# Patient Record
Sex: FEMALE | Race: WHITE | NOT HISPANIC OR LATINO | Employment: FULL TIME | URBAN - METROPOLITAN AREA
[De-identification: names, ages, dates, MRNs, and addresses within clinical notes are randomized per-mention and may not be internally consistent; named-entity substitution may affect disease eponyms.]

---

## 2017-07-13 ENCOUNTER — TRANSFERRED RECORDS (OUTPATIENT)
Dept: HEALTH INFORMATION MANAGEMENT | Facility: CLINIC | Age: 42
End: 2017-07-13

## 2017-08-24 ENCOUNTER — TRANSFERRED RECORDS (OUTPATIENT)
Dept: HEALTH INFORMATION MANAGEMENT | Facility: CLINIC | Age: 42
End: 2017-08-24

## 2018-01-29 ENCOUNTER — TRANSFERRED RECORDS (OUTPATIENT)
Dept: HEALTH INFORMATION MANAGEMENT | Facility: CLINIC | Age: 43
End: 2018-01-29

## 2019-04-01 ENCOUNTER — TRANSFERRED RECORDS (OUTPATIENT)
Dept: HEALTH INFORMATION MANAGEMENT | Facility: CLINIC | Age: 44
End: 2019-04-01

## 2020-12-04 ENCOUNTER — VIRTUAL VISIT (OUTPATIENT)
Dept: FAMILY MEDICINE | Facility: OTHER | Age: 45
End: 2020-12-04

## 2020-12-04 NOTE — PROGRESS NOTES
"Date: 2020 14:24:10  Clinician: Demetrius Sullivan  Clinician NPI: [id number]  Patient: Zahraa Flores  Patient : 1975  Patient Address: [address] [place], [place] [address]  Patient Phone: [phone]  Visit Protocol: URI  Patient Summary:  Zahraa is a 45 year old ( : 1975 ) female who initiated a [facility] Visit for COVID-19 (Coronavirus) evaluation and screening. When asked the question \"Please sign me up to receive news, health information and promotions from [facility].\", Zahraa responded \"No\".    Zahraa states her symptoms started gradually 7-9 days ago. After her symptoms started, they improved and then got worse again.   Her symptoms consist of a headache, nasal congestion, nausea, myalgia, malaise, and diarrhea.   Symptom details     Nasal secretions: The color of her mucus is clear.    Headache: She states the headache is moderate (4-6 on a 10 point pain scale).      Zahraa denies having ear pain, wheezing, fever, cough, vomiting, rhinitis, facial pain or pressure, chills, sore throat, teeth pain, ageusia, and anosmia. She also denies taking antibiotic medication in the past month, having recent facial or sinus surgery in the past 60 days, and having a sinus infection within the past year. She is not experiencing dyspnea.   Precipitating events  She has not recently been exposed to someone with influenza. Zahraa has been in close contact with the following high risk individuals: immunocompromised people.   Pertinent COVID-19 (Coronavirus) information  Zahraa does not work or volunteer as healthcare worker or a . In the past 14 days, Zahraa has not worked or volunteered at a healthcare facility or group living setting.   In the past 14 days, she also has not lived in a congregate living setting.   Zahraa has not had a close contact with a laboratory-confirmed COVID-19 patient within 14 days of symptom onset.    Since 2019, Zahraa has been tested for COVID-19 and has had upper " respiratory infection (URI) or influenza-like illness.      Result of COVID-19 test: Negative     Date of her COVID-19 test: 04/15/2020     Date(s) of previous URI or influenza-like illness (free-text): 01/15/2020     Symptoms Zahraa experienced during previous URI or influenza-like illness as reported by the patient (free-text): Cough, sore throat, headache        Pertinent medical history  Zahraa typically gets a yeast infection when she takes antibiotics. She has used fluconazole (Diflucan) to treat previous yeast infections. 1 dose of fluconazole (Diflucan) has typically been sufficient for symptoms to resolve in the past.   She has not been told by her provider to avoid NSAIDs.   Zahraa does not have diabetes. She denies having immunosuppressive conditions (e.g., chemotherapy, HIV, organ transplant, long-term use of steroids or other immunosuppressive medications, splenectomy). She does not have severe COPD and congestive heart failure. She does not have asthma.   Zahraa does not need a return to work/school note.   Weight: 114 lbs   Zahraa does not smoke or use smokeless tobacco.   She denies pregnancy and denies breastfeeding. She does not menstruate.   Additional information as reported by the patient (free text): I've been getting migraines and am out of my sumatriptan due to moving, and not having a doctor.   Weight: 114 lbs    MEDICATIONS: No current medications, ALLERGIES: Sulfa (Sulfonamide Antibiotics), Iodine and Iodide Containing Products, morphine  Clinician Response:  Dear Zahraa,   Your symptoms show that you may have coronavirus (COVID-19). This illness can cause fever, cough and trouble breathing. Many people get a mild case and get better on their own. Some people can get very sick.  What should I do?  We would like to test you for this virus.   1. Please call [phone] to schedule your visit. Explain that you were referred by [facility] to have a COVID-19 test. Be ready to share your [facility] visit ID  "number.  * If you need to schedule in [employer] please call [phone] or for [employer] employees please call [phone].  * If you need to schedule in the [place] area please call [phone]. [employer] employees call [phone].  The following will serve as your written order for this COVID Test, ordered by me, for the indication of suspected COVID [Z20.828]: The test will be ordered in [employer], our electronic health record, after you are scheduled. It will show as ordered and authorized by Satnam Coy MD.  Order: COVID-19 (Coronavirus) PCR for SYMPTOMATIC testing from [facility].   2. When it's time for your COVID test:  Stay at least 6 feet away from others. (If someone will drive you to your test, stay in the backseat, as far away from the  as you can.)   Cover your mouth and nose with a mask, tissue or washcloth.  Go straight to the testing site. Don't make any stops on the way there or back.      3.Starting now: Stay home and away from others (self-isolate) until:   You've had no fever---and no medicine that reduces fever---for one full day (24 hours). And...   Your other symptoms have gotten better. For example, your cough or breathing has improved. And...   At least 10 days have passed since your symptoms started.       During this time, don't leave the house except for testing or medical care.   Stay in your own room, even for meals. Use your own bathroom if you can.   Stay away from others in your home. No hugging, kissing or shaking hands. No visitors.  Don't go to work, school or anywhere else.    Clean \"high touch\" surfaces often (doorknobs, counters, handles, etc.). Use a household cleaning spray or wipes. You'll find a full list of  on the EPA website: www.epa.gov/pesticide-registration/list-n-disinfectants-use-against-sars-cov-2.   Cover your mouth and nose with a mask, tissue or washcloth to avoid spreading germs.  Wash your hands and face often. Use soap and water.  Caregivers in " these groups are at risk for severe illness due to COVID-19:  o People 65 years and older  o People who live in a nursing home or long-term care facility  o People with chronic disease (lung, heart, cancer, diabetes, kidney, liver, immunologic)  o People who have a weakened immune system, including those who:   Are in cancer treatment  Take medicine that weakens the immune system, such as corticosteroids  Had a bone marrow or organ transplant  Have an immune deficiency  Have poorly controlled HIV or AIDS  Are obese (body mass index of 40 or higher)  Smoke regularly   o Caregivers should wear gloves while washing dishes, handling laundry and cleaning bedrooms and bathrooms.  o Use caution when washing and drying laundry: Don't shake dirty laundry, and use the warmest water setting that you can.  o For more tips, go to www.cdc.gov/coronavirus/2019-ncov/downloads/10Things.pdf.    4.Sign up for [employer]. We know it's scary to hear that you might have COVID-19. We want to track your symptoms to make sure you're okay over the next 2 weeks. Please look for an email from [employer]---this is a free, online program that we'll use to keep in touch. To sign up, follow the link in the email. Learn more at [email or web address]  How can I take care of myself?   Get lots of rest. Drink extra fluids (unless a doctor has told you not to).   Take Tylenol (acetaminophen) for fever or pain. If you have liver or kidney problems, ask your family doctor if it's okay to take Tylenol.   Adults can take either:    650 mg (two 325 mg pills) every 4 to 6 hours, or...   1,000 mg (two 500 mg pills) every 8 hours as needed.    Note: Don't take more than 3,000 mg in one day. Acetaminophen is found in many medicines (both prescribed and over-the-counter medicines). Read all labels to be sure you don't take too much.   For children, check the Tylenol bottle for the right dose. The dose is based on the child's age or weight.    If  you have other health problems (like cancer, heart failure, an organ transplant or severe kidney disease): Call your specialty clinic if you don't feel better in the next 2 days.       Know when to call 911. Emergency warning signs include:    Trouble breathing or shortness of breath Pain or pressure in the chest that doesn't go away Feeling confused like you haven't felt before, or not being able to wake up Bluish-colored lips or face.  Where can I get more information?   [facility] -- About COVID-19: [email or web address]   CDC -- What to Do If You're Sick: www.cdc.gov/coronavirus/2019-ncov/about/steps-when-sick.html   [facility] -- Ending Home Isolation: www.cdc.gov/coronavirus/2019-ncov/hcp/disposition-in-home-patients.html   [facility] -- Caring for Someone: www.cdc.gov/coronavirus/2019-ncov/if-you-are-sick/care-for-someone.html   [facility] -- Interim Guidance for Hospital Discharge to Home: www.[facility].mn./diseases/coronavirus/hcp/hospdischarge.pdf   [facility] clinical trials (COVID-19 research studies): clinicalaffairs.[facility]-clinical-trials    Below are the COVID-19 hotlines at the [place] Department of Health ([facility]). Interpreters are available.    For health questions: Call 699-922-8171 or 1-355.889.1967 (7 a.m. to 7 p.m.) For questions about schools and childcare: Call 956-231-9922 or 1-175.411.4089 (7 a.m. to 7 p.m.)    Diagnosis: Contact with and (suspected) exposure to other viral communicable diseases  Diagnosis ICD: Z20.828  Prescription: sumatriptan succinate (Imitrex) 25 mg oral tablet 15 tablet, 0 days supply. Take 1 tablet by mouth, may repeat in 2 hrs. Refills: 0, Refill as needed: no, Allow substitutions: yes  Pharmacy: [facility] - [phone] - [address] [place] [id number]

## 2020-12-05 DIAGNOSIS — Z20.822 ENCOUNTER FOR LABORATORY TESTING FOR COVID-19 VIRUS: Primary | ICD-10-CM

## 2020-12-05 PROCEDURE — U0003 INFECTIOUS AGENT DETECTION BY NUCLEIC ACID (DNA OR RNA); SEVERE ACUTE RESPIRATORY SYNDROME CORONAVIRUS 2 (SARS-COV-2) (CORONAVIRUS DISEASE [COVID-19]), AMPLIFIED PROBE TECHNIQUE, MAKING USE OF HIGH THROUGHPUT TECHNOLOGIES AS DESCRIBED BY CMS-2020-01-R: HCPCS | Performed by: FAMILY MEDICINE

## 2020-12-06 LAB
SARS-COV-2 RNA SPEC QL NAA+PROBE: NOT DETECTED
SPECIMEN SOURCE: NORMAL

## 2021-01-05 ENCOUNTER — OFFICE VISIT (OUTPATIENT)
Dept: URGENT CARE | Facility: URGENT CARE | Age: 46
End: 2021-01-05
Payer: COMMERCIAL

## 2021-01-05 VITALS
RESPIRATION RATE: 16 BRPM | BODY MASS INDEX: 21.83 KG/M2 | OXYGEN SATURATION: 98 % | TEMPERATURE: 98 F | WEIGHT: 118.6 LBS | SYSTOLIC BLOOD PRESSURE: 124 MMHG | DIASTOLIC BLOOD PRESSURE: 70 MMHG | HEIGHT: 62 IN | HEART RATE: 88 BPM

## 2021-01-05 DIAGNOSIS — M54.50 ACUTE LEFT-SIDED LOW BACK PAIN WITHOUT SCIATICA: Primary | ICD-10-CM

## 2021-01-05 PROCEDURE — 99204 OFFICE O/P NEW MOD 45 MIN: CPT | Performed by: PHYSICIAN ASSISTANT

## 2021-01-05 RX ORDER — BACLOFEN 10 MG/1
10 TABLET ORAL 3 TIMES DAILY
Qty: 30 TABLET | Refills: 0 | Status: SHIPPED | OUTPATIENT
Start: 2021-01-05 | End: 2021-01-12

## 2021-01-05 RX ORDER — HYDROCODONE BITARTRATE AND ACETAMINOPHEN 5; 325 MG/1; MG/1
1 TABLET ORAL EVERY 6 HOURS PRN
Qty: 10 TABLET | Refills: 0 | Status: SHIPPED | OUTPATIENT
Start: 2021-01-05 | End: 2021-01-08

## 2021-01-05 RX ORDER — PREDNISONE 20 MG/1
40 TABLET ORAL DAILY
Qty: 10 TABLET | Refills: 0 | Status: SHIPPED | OUTPATIENT
Start: 2021-01-05 | End: 2021-01-10

## 2021-01-05 ASSESSMENT — ENCOUNTER SYMPTOMS
CONSTIPATION: 0
VOMITING: 0
SINUS PRESSURE: 0
EYE PAIN: 0
ARTHRALGIAS: 0
HEMATOCHEZIA: 0
EYE REDNESS: 0
NAUSEA: 0
ABDOMINAL PAIN: 0
SORE THROAT: 0
CHILLS: 0
COUGH: 0
EYE DISCHARGE: 0
SINUS PAIN: 0
WHEEZING: 0
BACK PAIN: 1
FATIGUE: 0
MYALGIAS: 0
SHORTNESS OF BREATH: 0
RHINORRHEA: 0
HEARTBURN: 0
PALPITATIONS: 0
DIARRHEA: 0
FEVER: 0

## 2021-01-05 ASSESSMENT — MIFFLIN-ST. JEOR: SCORE: 1133.28

## 2021-01-05 ASSESSMENT — PAIN SCALES - GENERAL: PAINLEVEL: MODERATE PAIN (5)

## 2021-01-05 NOTE — PROGRESS NOTES
Assessment & Plan     Acute left-sided low back pain without sciatica  Reviewed last urgent care note from [facility] 02/29/20 and patient was treated with a medrol dose pack and 10 Norco. Patient reports that she responded well. Will treat with prednisone 40mg once daily x 5 days Also prescribed [id number] every 8-12 hours as needed and Norco 5/325mg #10 with no refills. Can try alternating heat/ice in 20 minute intervals. Avoid aggravating factors, but encouraged gentle ROM and stretching. Would recommend PT or f/u with PCP if symptoms worsen or do not improve.      - predniSONE (DELTASONE) 20 MG tablet; Take 2 tablets (40 mg) by mouth daily for 5 days  - baclofen (LIORESAL) 10 MG tablet; Take 1 tablet (10 mg) by mouth 3 times daily  - HYDROcodone-acetaminophen (NORCO) 5-325 MG tablet; Take 1 tablet by mouth every 6 hours as needed for severe pain    Review of prior external note(s) from - CareEverywhere information from [facility] reviewed                       [facility] [place]    Subjective     Chief Complaint   Patient presents with     Back Pain     12/25/2020 lower left back pain has a heirniated disc, pain radiates to the front, unble to sleep, tylenol & ibuprofen not helping. Has a handicapped daughter that she has to lift.       HPI     Back Pain    Onset of symptoms was 1.5 week(s) ago.  Location: right low back  Radiation: does not radiate  Context:       The injury happened while at home      Mechanism: from lifting daughter, has to do full cares on daughter who is 11 years old      Patient experienced delayed pain  Course of symptoms is worsening.    Severity moderate  Current and Associated symptoms: pain  Denies: fecal incontinence, urinary incontinence, lower extremity numbness, lower extremity weakness and paresthesia    Aggravating Factors: sitting, bending, changing position and stairs  Therapies to improve symptoms include: ibuprofen and Tylenol  Past  "history: previous bulging disc 5 years ago         Review of Systems   Constitutional: Negative for chills, fatigue and fever.   HENT: Negative for congestion, ear pain, rhinorrhea, sinus pressure, sinus pain and sore throat.    Eyes: Negative for pain, discharge and redness.   Respiratory: Negative for cough, shortness of breath and wheezing.    Cardiovascular: Negative for palpitations.   Gastrointestinal: Negative for abdominal pain, constipation, diarrhea, heartburn, hematochezia, nausea and vomiting.   Musculoskeletal: Positive for back pain. Negative for arthralgias and myalgias.   Skin: Negative for rash.              Objective    /70 (BP Location: Right arm, Patient Position: Sitting, Cuff Size: Adult Regular)   Pulse 88   Temp 98  F (36.7  C) (Tympanic)   Resp 16   Ht 1.562 m (5' 1.5\")   Wt 53.8 kg (118 lb 9.6 oz)   SpO2 98%   BMI 22.05 kg/m    Body mass index is 22.05 kg/m .  Physical Exam  Constitutional:       General: She is not in acute distress.     Appearance: She is well-developed.   HENT:      Head: Normocephalic.      Right Ear: Tympanic membrane and ear canal normal.      Left Ear: Tympanic membrane and ear canal normal.   Eyes:      Conjunctiva/sclera: Conjunctivae normal.      Pupils: Pupils are equal, round, and reactive to light.   Cardiovascular:      Rate and Rhythm: Normal rate.      Heart sounds: Normal heart sounds.   Pulmonary:      Effort: Pulmonary effort is normal.      Breath sounds: Normal breath sounds.   Musculoskeletal:      Comments: No obvious deformity, erythema, edema, or ecchymosis of the thoracic or lumbar spine. There is some tenderness with palpation of left low back but patient reports deep pain with bending forward.  Negative straight leg raises. Non-tender internal and external rotation of the hips. 2+ DTR s, lower extremity CMS intact.     Skin:     General: Skin is warm and dry.      Findings: No rash.   Neurological:      Mental Status: She is alert. "   Psychiatric:         Behavior: Behavior normal.            No results found for this or any previous visit (from the past 24 hour(s)).

## 2021-01-06 ASSESSMENT — ENCOUNTER SYMPTOMS
PARESTHESIAS: 0
CHILLS: 0
NAUSEA: 0
ABDOMINAL PAIN: 0
HEARTBURN: 0
FEVER: 0
PALPITATIONS: 0
WEAKNESS: 0
FREQUENCY: 0
ARTHRALGIAS: 0
SORE THROAT: 0
SHORTNESS OF BREATH: 0
HEMATURIA: 0
NERVOUS/ANXIOUS: 0
EYE PAIN: 0
DIZZINESS: 0
COUGH: 0
DYSURIA: 0
DIARRHEA: 0
HEADACHES: 1
HEMATOCHEZIA: 0
MYALGIAS: 1
JOINT SWELLING: 0
BREAST MASS: 0
CONSTIPATION: 0

## 2021-01-09 ENCOUNTER — OFFICE VISIT (OUTPATIENT)
Dept: URGENT CARE | Facility: URGENT CARE | Age: 46
End: 2021-01-09
Payer: COMMERCIAL

## 2021-01-09 ENCOUNTER — HEALTH MAINTENANCE LETTER (OUTPATIENT)
Age: 46
End: 2021-01-09

## 2021-01-09 VITALS
SYSTOLIC BLOOD PRESSURE: 122 MMHG | RESPIRATION RATE: 16 BRPM | WEIGHT: 124.2 LBS | HEIGHT: 62 IN | TEMPERATURE: 97.5 F | HEART RATE: 80 BPM | DIASTOLIC BLOOD PRESSURE: 70 MMHG | OXYGEN SATURATION: 99 % | BODY MASS INDEX: 22.86 KG/M2

## 2021-01-09 DIAGNOSIS — M54.50 ACUTE LEFT-SIDED LOW BACK PAIN WITHOUT SCIATICA: Primary | ICD-10-CM

## 2021-01-09 PROCEDURE — 99213 OFFICE O/P EST LOW 20 MIN: CPT | Performed by: EMERGENCY MEDICINE

## 2021-01-09 RX ORDER — KETOROLAC TROMETHAMINE 10 MG/1
10 TABLET, FILM COATED ORAL EVERY 6 HOURS PRN
Qty: 20 TABLET | Refills: 0 | Status: SHIPPED | OUTPATIENT
Start: 2021-01-09 | End: 2021-02-04

## 2021-01-09 ASSESSMENT — MIFFLIN-ST. JEOR: SCORE: 1158.68

## 2021-01-09 ASSESSMENT — PAIN SCALES - GENERAL: PAINLEVEL: SEVERE PAIN (7)

## 2021-01-09 NOTE — PATIENT INSTRUCTIONS
Warm soaks plus gentle stretching for seeing in the morning    Ketorolac for pain    Quiet activity    Recheck Tuesday as planned    Strongly consider massage

## 2021-01-09 NOTE — PROGRESS NOTES
HPI: Patient is a 45-year-old female who presents to urgent care for left lower back pain.  She has an invalid daughter which requires frequent heavy lifting.  There is been no kallie injury.  She was seen here about 5 days ago at which time she was put on a short course of steroids and given hydrocodone.  She describes the pain as left lower back with some occasional radiation deep into her hip region.  No numbness or tingling down her leg.  No bowel or bladder dysfunction.  She has a history of apparent prolapsed disc in the past but states this pain feels differently.  She does have a history of kidney stones.  She has no dysuria urgency or hematuria.  The pain clearly hurts to bend twist and move.      ROS: See HPI otherwise normal.    Allergies   Allergen Reactions     Iodine Other (See Comments)     Morphine Nausea and Vomiting     Sulfa Drugs Hives     Tramadol Unknown     Shaking and cold      Current Outpatient Medications   Medication Sig Dispense Refill     baclofen (LIORESAL) 10 MG tablet Take 1 tablet (10 mg) by mouth 3 times daily 30 tablet 0     ketorolac (TORADOL) 10 MG tablet Take 1 tablet (10 mg) by mouth every 6 hours as needed for moderate pain 20 tablet 0     predniSONE (DELTASONE) 20 MG tablet Take 2 tablets (40 mg) by mouth daily for 5 days 10 tablet 0         PE: Patient notably stiff when she tries to move related to her back pain.  Examination of her back reveals localized tenderness in the lower lumbar/sacral region on the left.  Range of motion does increase her pain.  No tenderness over the left sciatic notch.  Sensorimotor exam are intact in the left leg.  Overlying skin reveals no zoster rash.        TREATMENT: None      ASSESSMENT: Back pain of musculoskeletal etiology.  Spasm and inflammation most likely causing that she is palpably tender in the localized area.      DIAGNOSIS: Lower back pain      PLAN: Strongly consider massage.  Ketorolac will be ordered for pain.  Warm soaks plus  gentle stretching.  She has an appointment with Zahraa on Tuesday which she should keep for reevaluation.

## 2021-01-12 ENCOUNTER — OFFICE VISIT (OUTPATIENT)
Dept: FAMILY MEDICINE | Facility: CLINIC | Age: 46
End: 2021-01-12
Payer: COMMERCIAL

## 2021-01-12 VITALS
RESPIRATION RATE: 16 BRPM | HEART RATE: 84 BPM | HEIGHT: 62 IN | SYSTOLIC BLOOD PRESSURE: 100 MMHG | BODY MASS INDEX: 22.26 KG/M2 | WEIGHT: 121 LBS | DIASTOLIC BLOOD PRESSURE: 62 MMHG | TEMPERATURE: 97.8 F

## 2021-01-12 DIAGNOSIS — G43.009 MIGRAINE WITHOUT AURA AND WITHOUT STATUS MIGRAINOSUS, NOT INTRACTABLE: ICD-10-CM

## 2021-01-12 DIAGNOSIS — Z00.00 ROUTINE GENERAL MEDICAL EXAMINATION AT A HEALTH CARE FACILITY: Primary | ICD-10-CM

## 2021-01-12 PROCEDURE — 99386 PREV VISIT NEW AGE 40-64: CPT | Performed by: NURSE PRACTITIONER

## 2021-01-12 RX ORDER — SUMATRIPTAN 50 MG/1
50 TABLET, FILM COATED ORAL
Qty: 12 TABLET | Refills: 1 | Status: SHIPPED | OUTPATIENT
Start: 2021-01-12 | End: 2021-04-10

## 2021-01-12 RX ORDER — SUMATRIPTAN 50 MG/1
TABLET, FILM COATED ORAL
COMMUNITY
Start: 2020-07-01 | End: 2021-01-12

## 2021-01-12 SDOH — HEALTH STABILITY: MENTAL HEALTH: HOW OFTEN DO YOU HAVE A DRINK CONTAINING ALCOHOL?: NEVER

## 2021-01-12 ASSESSMENT — ENCOUNTER SYMPTOMS
PALPITATIONS: 0
CONSTIPATION: 0
DYSURIA: 0
HEARTBURN: 0
BREAST MASS: 0
JOINT SWELLING: 0
NERVOUS/ANXIOUS: 0
ABDOMINAL PAIN: 0
COUGH: 0
WEAKNESS: 0
PARESTHESIAS: 0
MYALGIAS: 1
DIARRHEA: 0
HEADACHES: 1
SHORTNESS OF BREATH: 0
HEMATOCHEZIA: 0
EYE PAIN: 0
DIZZINESS: 0
FREQUENCY: 0
CHILLS: 0
SORE THROAT: 0
NAUSEA: 0
HEMATURIA: 0
ARTHRALGIAS: 0
FEVER: 0

## 2021-01-12 ASSESSMENT — MIFFLIN-ST. JEOR: SCORE: 1144.16

## 2021-01-12 NOTE — PATIENT INSTRUCTIONS
Please call [phone] to schedule your mammogram    Preventive Health Recommendations  Female Ages 40 to 49    Yearly exam:     See your health care provider every year in order to  1. Review health changes.   2. Discuss preventive care.    3. Review your medicines if your doctor prescribed any.      Get a Pap test every three years (unless you have an abnormal result and your provider advises testing more often).      If you get Pap tests with HPV test, you only need to test every 5 years, unless you have an abnormal result. You do not need a Pap test if your uterus was removed (hysterectomy) and you have not had cancer.      You should be tested each year for STDs (sexually transmitted diseases), if you're at risk.     Ask your doctor if you should have a mammogram.      Have a colonoscopy (test for colon cancer) if someone in your family has had colon cancer or polyps before age 50.       Have a cholesterol test every 5 years.       Have a diabetes test (fasting glucose) after age 45. If you are at risk for diabetes, you should have this test every 3 years.    Shots: Get a flu shot each year. Get a tetanus shot every 10 years.     Nutrition:     Eat at least 5 servings of fruits and vegetables each day.    Eat whole-grain bread, whole-wheat pasta and brown rice instead of white grains and rice.    Get adequate Calcium and Vitamin D.      Lifestyle    Exercise at least 150 minutes a week (an average of 30 minutes a day, 5 days a week). This will help you control your weight and prevent disease.    Limit alcohol to one drink per day.    No smoking.     Wear sunscreen to prevent skin cancer.    See your dentist every six months for an exam and cleaning.

## 2021-01-12 NOTE — PROGRESS NOTES
SUBJECTIVE:   CC: Zahraa Flores is an 45 year old woman who presents for preventive health visit.     Patient has been advised of split billing requirements and indicates understanding: Yes  Healthy Habits:     Getting at least 3 servings of Calcium per day:  Yes    Bi-annual eye exam:  Yes    Dental care twice a year:  NO    Sleep apnea or symptoms of sleep apnea:  None    Diet:  Regular (no restrictions)    Frequency of exercise:  None    Taking medications regularly:  Yes    Medication side effects:  Not applicable    PHQ-2 Total Score: 0    Additional concerns today:  No    Needs refill of Imitrex   Stable symptoms  Still has prescription from prior to move, has not been filled     Today's PHQ-2 Score:   PHQ-2 (  [employer]) 2021   Q1: Little interest or pleasure in doing things 0   Q2: Feeling down, depressed or hopeless 0   PHQ-2 Score 0   Q1: Little interest or pleasure in doing things Not at all   Q2: Feeling down, depressed or hopeless Not at all   PHQ-2 Score 0     Abuse: Current or Past (Physical, Sexual or Emotional) - No  Do you feel safe in your environment? Yes    Social History     Tobacco Use     Smoking status: Former Smoker     Packs/day: 2.00     Years: 5.00     Pack years: 10.00     Types: Cigarettes     Start date: 1991     Quit date: 1996     Years since quittin.5     Smokeless tobacco: Never Used     Tobacco comment: Quite in her 20's.   Substance Use Topics     Alcohol use: Not Currently     Frequency: Never       Alcohol Use 2021   Prescreen: >3 drinks/day or >7 drinks/week? Not Applicable       Reviewed orders with patient.  Reviewed health maintenance and updated orders accordingly - Yes    Mammogram Screening: Patient under age 50, mutual decision reflected in health maintenance.      Pertinent mammograms are reviewed under the imaging tab.  History of abnormal Pap smear: Status post hysterectomy. Records requested to determine need for PAP     Reviewed and  "updated as needed this visit by clinical staff  Tobacco  Allergies  Meds   Med Hx  Surg Hx  Fam Hx  Soc Hx        Reviewed and updated as needed this visit by Provider  Tobacco     Med Hx  Surg Hx  Fam Hx  Soc Hx         Review of Systems   Constitutional: Negative for chills and fever.   HENT: Negative for congestion, ear pain, hearing loss and sore throat.    Eyes: Positive for visual disturbance. Negative for pain.   Respiratory: Negative for cough and shortness of breath.    Cardiovascular: Negative for chest pain, palpitations and peripheral edema.   Gastrointestinal: Negative for abdominal pain, constipation, diarrhea, heartburn, hematochezia and nausea.   Breasts:  Negative for tenderness, breast mass and discharge.   Genitourinary: Positive for vaginal discharge. Negative for dysuria, frequency, genital sores, hematuria, pelvic pain, urgency and vaginal bleeding.   Musculoskeletal: Positive for myalgias. Negative for arthralgias and joint swelling.   Skin: Negative for rash.   Neurological: Positive for headaches. Negative for dizziness, weakness and paresthesias.   Psychiatric/Behavioral: Negative for mood changes. The patient is not nervous/anxious.      OBJECTIVE:   /62 (Cuff Size: Adult Regular)   Pulse 84   Temp 97.8  F (36.6  C) (Tympanic)   Resp 16   Ht 1.562 m (5' 1.5\")   Wt 54.9 kg (121 lb)   BMI 22.49 kg/m    Physical Exam  GENERAL: healthy, alert and no distress  EYES: Eyes grossly normal to inspection, PERRL and conjunctivae and sclerae normal  HENT: ear canals and TM's normal, nose and mouth without ulcers or lesions  NECK: no adenopathy, no asymmetry, masses, or scars and thyroid normal to palpation  RESP: lungs clear to auscultation - no rales, rhonchi or wheezes  BREAST: normal without masses, tenderness or nipple discharge and no palpable axillary masses or adenopathy  CV: regular rate and rhythm, normal S1 S2, no S3 or S4, no murmur, click or rub, no peripheral edema " "and peripheral pulses strong  ABDOMEN: soft, nontender, no hepatosplenomegaly, no masses and bowel sounds normal  MS: no gross musculoskeletal defects noted, no edema  SKIN: no suspicious lesions or rashes  NEURO: Normal strength and tone, mentation intact and speech normal  PSYCH: mentation appears normal, affect normal/bright    Diagnostic Test Results:  none     ASSESSMENT/PLAN:   1. Routine general medical examination at a health care facility  No concerns today.     2. Migraine without aura and without status migrainosus, not intractable  Stable. Rare use.   - SUMAtriptan (IMITREX) 50 MG tablet; Take 1 tablet (50 mg) by mouth at onset of headache for migraine May repeat after two hours if needed  Dispense: 12 tablet; Refill: 1    Patient has been advised of split billing requirements and indicates understanding: Yes  COUNSELING:  Reviewed preventive health counseling, as reflected in patient instructions    Estimated body mass index is 22.49 kg/m  as calculated from the following:    Height as of this encounter: 1.562 m (5' 1.5\").    Weight as of this encounter: 54.9 kg (121 lb).        She reports that she quit smoking about 24 years ago. Her smoking use included cigarettes. She started smoking about 29 years ago. She has a 10.00 pack-year smoking history. She has never used smokeless tobacco.      Counseling Resources:  ATP IV Guidelines  Pooled Cohorts Equation Calculator  Breast Cancer Risk Calculator  BRCA-Related Cancer Risk Assessment: FHS-7 Tool  FRAX Risk Assessment  ICSI Preventive Guidelines  Dietary Guidelines for Americans, 2010  USDA's MyPlate  ASA Prophylaxis  Lung CA Screening    DALIA Davila CNP  [facility]  "

## 2021-02-04 ENCOUNTER — TELEPHONE (OUTPATIENT)
Dept: FAMILY MEDICINE | Facility: CLINIC | Age: 46
End: 2021-02-04

## 2021-02-04 ENCOUNTER — MYC MEDICAL ADVICE (OUTPATIENT)
Dept: FAMILY MEDICINE | Facility: CLINIC | Age: 46
End: 2021-02-04

## 2021-02-04 ENCOUNTER — OFFICE VISIT (OUTPATIENT)
Dept: FAMILY MEDICINE | Facility: CLINIC | Age: 46
End: 2021-02-04
Payer: COMMERCIAL

## 2021-02-04 VITALS
BODY MASS INDEX: 22.82 KG/M2 | OXYGEN SATURATION: 100 % | RESPIRATION RATE: 14 BRPM | DIASTOLIC BLOOD PRESSURE: 64 MMHG | TEMPERATURE: 97.7 F | HEART RATE: 93 BPM | WEIGHT: 124 LBS | HEIGHT: 62 IN | SYSTOLIC BLOOD PRESSURE: 102 MMHG

## 2021-02-04 DIAGNOSIS — M54.42 LEFT-SIDED LOW BACK PAIN WITH LEFT-SIDED SCIATICA, UNSPECIFIED CHRONICITY: Primary | ICD-10-CM

## 2021-02-04 DIAGNOSIS — T39.395A NSAID INDUCED GASTRITIS: ICD-10-CM

## 2021-02-04 DIAGNOSIS — M53.3 SACROILIAC PAIN: ICD-10-CM

## 2021-02-04 DIAGNOSIS — K29.60 NSAID INDUCED GASTRITIS: ICD-10-CM

## 2021-02-04 DIAGNOSIS — M53.3 SACROILIAC JOINT PAIN: Primary | ICD-10-CM

## 2021-02-04 DIAGNOSIS — M53.3 SACROILIAC JOINT PAIN: ICD-10-CM

## 2021-02-04 PROCEDURE — 99214 OFFICE O/P EST MOD 30 MIN: CPT | Performed by: PHYSICIAN ASSISTANT

## 2021-02-04 RX ORDER — MELOXICAM 7.5 MG/1
7.5-15 TABLET ORAL DAILY
Qty: 60 TABLET | Refills: 1 | Status: SHIPPED | OUTPATIENT
Start: 2021-02-04 | End: 2021-02-05

## 2021-02-04 ASSESSMENT — MIFFLIN-ST. JEOR: SCORE: 1157.77

## 2021-02-04 NOTE — TELEPHONE ENCOUNTER
Returned call to patient. Back pain is worse. Better relief with toradol prescribed from urgent care but was short term prescription. Taking 600 mg Ibuprofen 3 x daily, takes the edge off, Feels pain is getting worse. Appointment scheduled.  Thank you. Valentina ENRIQUEZ RN

## 2021-02-04 NOTE — PROGRESS NOTES
Assessment & Plan     Sacroiliac joint pain  worse  - PHYSICAL THERAPY REFERRAL; Future  - meloxicam (MOBIC) 7.5 MG tablet; Take 1-2 tablets (7.5-15 mg) by mouth daily    NSAID induced gastritis  new  - omeprazole (PRILOSEC) 20 MG DR capsule; Take 1 capsule (20 mg) by mouth daily For stomach protection while on meloxicam.    Patient Instructions   Try Physical Therapy   Start meloxicam for pain relief meanwhile - no ibuprofen  Ok to still take or overlap with acetaminophen (tylenol) - up to 1000 mg three times daily.  Omeprazole to help heal up/protect stomach from ibuprofen/while on meloxicam  If does not work can call me if wanting to try other similar med  Let me know if just wanting MRI ordered      Return if symptoms worsen or fail to improve.    [facility]    Miriam Vital is a 45 year old who presents to clinic today for the following health issues     HPI     Back Pain  Onset/Duration: 12/25/2020  Description:   Location of pain: low back left  Character of pain: sharp and constant  Pain radiation: radiates into the left buttocks and radiates into the left leg (started today, first time happening)  New numbness or weakness in legs, not attributed to pain: no   Intensity: Currently 7/10  Progression of Symptoms: worsening  History:   Specific cause: lifting.  Her daughter is handicapped, has to lift her and rotate her.   Pain interferes with job: YES  History of back problems: previous herniated disc years ago  Any previous MRI or X-rays: Yes- down in [place] years ago  Sees a specialist for back pain: No  Alleviating factors:   Improved by: NSAIDs    Precipitating factors:  Worsened by: Lifting and Bending.  Waking up in the morning is worse  Therapies tried and outcome: 9 tablets ibuprofen daily. 3 in morning, 3 in afternoon, and 3 at night     A lot of lifting her daughter day prior to this happening.  Tried medrol, flexeril.     Since pain  "started on 12/25, pain has worsened and also going down into anterior L thigh.    Hurts with any hunching over, better to extend.  Stomach upset with ibuprofen.      Accompanying Signs & Symptoms:  Risk of Fracture: None  Risk of Cauda Equina: None  Risk of Infection: None  Risk of Cancer: None  Risk of Ankylosing Spondylitis: Onset at age <35, male, AND morning back stiffness  no         Objective    /64 (BP Location: Right arm, Patient Position: Chair, Cuff Size: Adult Regular)   Pulse 93   Temp 97.7  F (36.5  C) (Tympanic)   Resp 14   Ht 1.562 m (5' 1.5\")   Wt 56.2 kg (124 lb)   SpO2 100%   BMI 23.05 kg/m    Body mass index is 23.05 kg/m .  Physical Exam   GENERAL: healthy, alert and no distress  Back: Spine not tender to palpation.  Pain is over L sacroiliac joint but no tenderness.  Normal flexion but with pain, extension normal, side bending and twisting to R cause tight feeling.  Mild tenderness at L sciatic trigger.  R blaise pos for L sacroiliac joint pain, L sided blaise unremarkable.    NEURO: Gait normal.  Hip adduction and abduction, and hip, knee, and ankle flexion and extension equal bilaterally and with good strength.  No pain on straight leg raise.          "

## 2021-02-04 NOTE — TELEPHONE ENCOUNTER
Reason for call:  Patient reporting a symptom    Symptom or request: Back Pain - On going pain getting worse. Pt said she is taking 9 Ibuprofen a day with no relief. Pt said she has seen CECILIA garcía for this issue that is getting worse.  Please Advise    Phone Number patient can be reached at:  Home number on file [phone] (home)    Best Time:  Any Time      Can we leave a detailed message on this number:  YES    Call taken on 2/4/2021 at 9:35 AM by Vanna Kirkpatrick

## 2021-02-04 NOTE — PATIENT INSTRUCTIONS
Try Physical Therapy   Start meloxicam for pain relief meanwhile - no ibuprofen  Ok to still take or overlap with acetaminophen (tylenol) - up to 1000 mg three times daily.  Omeprazole to help heal up/protect stomach from ibuprofen/while on meloxicam  If does not work can call me if wanting to try other similar med  Let me know if just wanting MRI ordered

## 2021-02-04 NOTE — TELEPHONE ENCOUNTER
Reason for call:  Patient reporting a symptom    Symptom or request: Pt was to call back if decided to have the MRI - Back. Pt is requesting a Order to be placed.     Phone Number patient can be reached at:  Home number on file [phone] (home)    Best Time:  Any Time      Can we leave a detailed message on this number:  YES    Call taken on 2/4/2021 at 1:58 PM by Vanna Kirkpatrick     1y2m with fever, cough. likely URI etiology, however erythematous throat noted with reports of pt not taking in food. Will treat for pharyngitis with antibiotics

## 2021-02-04 NOTE — TELEPHONE ENCOUNTER
See [employer] message, gave # to schedule.  Also asked if any claustrophobia.   JANINE Paris RN

## 2021-02-04 NOTE — NURSING NOTE
"Chief Complaint   Patient presents with     Back Pain       Initial /64 (BP Location: Right arm, Patient Position: Chair, Cuff Size: Adult Regular)   Pulse 93   Temp 97.7  F (36.5  C) (Tympanic)   Resp 14   Ht 1.562 m (5' 1.5\")   Wt 56.2 kg (124 lb)   SpO2 100%   BMI 23.05 kg/m   Estimated body mass index is 23.05 kg/m  as calculated from the following:    Height as of this encounter: 1.562 m (5' 1.5\").    Weight as of this encounter: 56.2 kg (124 lb).    Patient presents to the clinic using No DME    Health Maintenance that is potentially due pending provider review:  NONE        Is there anyone who you would like to be able to receive your results? No  If yes have patient fill out ALICE      "

## 2021-02-05 RX ORDER — INDOMETHACIN 25 MG/1
25-50 CAPSULE ORAL 3 TIMES DAILY PRN
Qty: 60 CAPSULE | Refills: 1 | Status: SHIPPED | OUTPATIENT
Start: 2021-02-05 | End: 2021-02-10

## 2021-02-05 NOTE — TELEPHONE ENCOUNTER
[employer] message sent since no call back.  The MRI is scheduled, now just checking about claustrophobia.     Renetta MANCIA RN, BSN

## 2021-02-08 ENCOUNTER — HOSPITAL ENCOUNTER (OUTPATIENT)
Dept: MRI IMAGING | Facility: CLINIC | Age: 46
Discharge: HOME | End: 2021-02-08
Attending: PHYSICIAN ASSISTANT | Admitting: PHYSICIAN ASSISTANT
Payer: COMMERCIAL

## 2021-02-08 ENCOUNTER — MYC MEDICAL ADVICE (OUTPATIENT)
Dept: FAMILY MEDICINE | Facility: CLINIC | Age: 46
End: 2021-02-08

## 2021-02-08 ENCOUNTER — TELEPHONE (OUTPATIENT)
Dept: FAMILY MEDICINE | Facility: CLINIC | Age: 46
End: 2021-02-08

## 2021-02-08 DIAGNOSIS — M54.42 LEFT-SIDED LOW BACK PAIN WITH LEFT-SIDED SCIATICA, UNSPECIFIED CHRONICITY: ICD-10-CM

## 2021-02-08 DIAGNOSIS — M53.3 SACROILIAC PAIN: ICD-10-CM

## 2021-02-08 DIAGNOSIS — M54.50 LOW BACK PAIN, UNSPECIFIED BACK PAIN LATERALITY, UNSPECIFIED CHRONICITY, UNSPECIFIED WHETHER SCIATICA PRESENT: Primary | ICD-10-CM

## 2021-02-08 PROCEDURE — 72148 MRI LUMBAR SPINE W/O DYE: CPT

## 2021-02-08 NOTE — TELEPHONE ENCOUNTER
Reason for Call:  Request for results:    Name of test or procedure: MRI Pt has questions about results    Date of test of procedure: 2/8/2021    OK to leave the result message on voice mail or with a family member? YES    Phone number Patient can be reached at:  Home number on file [phone] (home)    Additional comments:     Call taken on 2/8/2021 at 5:26 PM by Odalis Page

## 2021-02-09 NOTE — TELEPHONE ENCOUNTER
Written by Isa Mathews PA-C on 2/8/2021  6:38 PM  Zahraa,     Your MRI showed some arthritis at the very bottom of your lumbar spine.  I would still suggest that you start with Physical Therapy.     Isa     Reviewed above MRI results with pt. States unable to proceed with PT at this time as will be staying with dtr with in hosp for next week or longer. Tends to re- aggravate back issue when caring for dtr with disability.   Currently taking ibuprofen 400-600 mg daily with food, omeprazole for best relief over previously tried meds- indocin, Toradol, meloxicam, tyl.    Asking if Isa has any other suggestions or will continue to take ibu. Discussed proper body mechanics with lifting, turning.  JANINE Paris RN

## 2021-02-09 NOTE — RESULT ENCOUNTER NOTE
Zahraa,    Your MRI showed some arthritis at the very bottom of your lumbar spine.  I would still suggest that you start with Physical Therapy.    Isa

## 2021-02-10 NOTE — TELEPHONE ENCOUNTER
Admitting no meds really seem to work.  She notes even norco 5 mg did not touch pain, 10 mg sort of touched pain.  Discussed gabapentin but she also doesn't want to leave daughter alone in hospital. Placed ortho/sports med referral for ASAP.  Will ask for location closer to her in [facility].

## 2021-03-07 ENCOUNTER — HEALTH MAINTENANCE LETTER (OUTPATIENT)
Age: 46
End: 2021-03-07

## 2021-03-12 ENCOUNTER — MYC MEDICAL ADVICE (OUTPATIENT)
Dept: FAMILY MEDICINE | Facility: CLINIC | Age: 46
End: 2021-03-12

## 2021-03-12 DIAGNOSIS — Z86.19 HISTORY OF COLD SORES: Primary | ICD-10-CM

## 2021-03-14 RX ORDER — ACYCLOVIR 400 MG/1
400 TABLET ORAL EVERY 8 HOURS
Qty: 15 TABLET | Refills: 0 | Status: SHIPPED | OUTPATIENT
Start: 2021-03-14 | End: 2021-03-22

## 2021-03-22 ENCOUNTER — OFFICE VISIT (OUTPATIENT)
Dept: FAMILY MEDICINE | Facility: CLINIC | Age: 46
End: 2021-03-22
Payer: COMMERCIAL

## 2021-03-22 VITALS
HEART RATE: 72 BPM | BODY MASS INDEX: 22.63 KG/M2 | TEMPERATURE: 98.3 F | HEIGHT: 62 IN | DIASTOLIC BLOOD PRESSURE: 82 MMHG | WEIGHT: 123 LBS | RESPIRATION RATE: 16 BRPM | SYSTOLIC BLOOD PRESSURE: 130 MMHG

## 2021-03-22 DIAGNOSIS — B00.1 RECURRENT COLD SORES: Primary | ICD-10-CM

## 2021-03-22 DIAGNOSIS — Z12.31 ENCOUNTER FOR SCREENING MAMMOGRAM FOR MALIGNANT NEOPLASM OF BREAST: ICD-10-CM

## 2021-03-22 PROCEDURE — 99213 OFFICE O/P EST LOW 20 MIN: CPT | Performed by: NURSE PRACTITIONER

## 2021-03-22 RX ORDER — VALACYCLOVIR HYDROCHLORIDE 500 MG/1
500 TABLET, FILM COATED ORAL DAILY
Qty: 90 TABLET | Refills: 3 | Status: SHIPPED | OUTPATIENT
Start: 2021-03-22 | End: 2022-04-25

## 2021-03-22 ASSESSMENT — MIFFLIN-ST. JEOR: SCORE: 1148.23

## 2021-03-22 NOTE — PROGRESS NOTES
"    Assessment & Plan     Recurrent cold sores  With recurrent cold sores Zahraa increased in restarting daily medication. Will start Valtrex daily for prophylaxis.  Symptomatic care and follow up discussed.   - valACYclovir (VALTREX) 500 MG tablet; Take 1 tablet (500 mg) by mouth daily    Encounter for screening mammogram for malignant neoplasm of breast    - *MA Screening Digital Bilateral; Future      Return in about 4 weeks (around 4/19/2021), or if symptoms worsen or fail to improve.    DALIA Davila CNP  [facility]    Subjective   Zahraa is a 46 year old who presents for the following health issues     HPI     Concern - Cold Sores   Onset: Many years - one bad one in last week   Description: sore on lips  Intensity: moderate  Progression of Symptoms:  improving  Accompanying Signs & Symptoms: none   Previous history of similar problem: cold sores in past   Precipitating factors:        Worsened by: stress   Therapies tried and outcome: Acycloir - would like to be on preventative       Review of Systems   Constitutional, HEENT, cardiovascular, pulmonary, gi and gu systems are negative, except as otherwise noted.      Objective    /82 (Cuff Size: Adult Regular)   Pulse 72   Temp 98.3  F (36.8  C) (Tympanic)   Resp 16   Ht 1.562 m (5' 1.5\")   Wt 55.8 kg (123 lb)   BMI 22.86 kg/m    Body mass index is 22.86 kg/m .  Physical Exam   GENERAL: healthy, alert and no distress  PSYCH: mentation appears normal, affect normal/bright          "

## 2021-04-07 ENCOUNTER — E-VISIT (OUTPATIENT)
Dept: FAMILY MEDICINE | Facility: CLINIC | Age: 46
End: 2021-04-07
Payer: COMMERCIAL

## 2021-04-07 DIAGNOSIS — J01.90 ACUTE SINUSITIS WITH SYMPTOMS > 10 DAYS: Primary | ICD-10-CM

## 2021-04-07 PROCEDURE — 99421 OL DIG E/M SVC 5-10 MIN: CPT | Performed by: NURSE PRACTITIONER

## 2021-04-07 RX ORDER — FLUCONAZOLE 150 MG/1
150 TABLET ORAL ONCE
Qty: 1 TABLET | Refills: 0 | Status: SHIPPED | OUTPATIENT
Start: 2021-04-07 | End: 2021-04-07

## 2021-04-07 NOTE — PATIENT INSTRUCTIONS
Dear Zahraa Flores    After reviewing your responses, I've been able to diagnose you with?a sinus infection caused by bacteria.?     Based on your responses and diagnosis, I have prescribed Augmentin to treat your symptoms. I have sent this to your pharmacy.?     It is also important to stay well hydrated, get lots of rest and take over-the-counter decongestants,?tylenol?or ibuprofen if you?are able to?take those medications per your primary care provider to help relieve discomfort.?     It is important that you take?all of?your prescribed medication even if your symptoms are improving after a few doses.? Taking?all of?your medicine helps prevent the symptoms from returning.?     If your symptoms worsen, you develop severe headache, vomiting, high fever (>102), or are not improving in 7 days, please contact your primary care provider for an appointment or visit any of our convenient Walk-in Care or Urgent Care Centers to be seen which can be found on our website?here.?     Thanks again for choosing?us?as your health care partner,?   ?  DALIA Davila CNP?     Sinusitis (Antibiotic Treatment)    The sinuses are air-filled spaces within the bones of the face. They connect to the inside of the nose. Sinusitis is an inflammation of the tissue that lines the sinuses. Sinusitis can occur during a cold. It can also happen due to allergies to pollens and other particles in the air. Sinusitis can cause symptoms of sinus congestion and a feeling of fullness. A sinus infection causes fever, headache, and facial pain. There is often green or yellow fluid draining from the nose or into the back of the throat (post-nasal drip). You have been given antibiotics to treat this condition.   Home care    Take the full course of antibiotics as instructed. Don't stop taking them, even when you feel better.    Drink plenty of water, hot tea, and other liquids as directed by the healthcare provider. This may help thin nasal  mucus. It also may help your sinuses drain fluids.    Heat may help soothe painful areas of your face. Use a towel soaked in hot water. Or,  the shower and direct the warm spray onto your face. Using a vaporizer along with a menthol rub at night may also help soothe symptoms.     An expectorant with guaifenesin may help thin nasal mucus and help your sinuses drain fluids. Talk with your provider or pharmacists before taking an over-the-counter (OTC) medicine if you have any questions about it or its side effects..    You can use an OTC decongestant, unless a similar medicine was prescribed to you. Nasal sprays work the fastest. Use one that contains phenylephrine or oxymetazoline. First blow your nose gently. Then use the spray. Don't use these medicines more often than directed on the label. If you do, your symptoms may get worse. You may also take pills that contain pseudoephedrine. Don t use products that combine multiple medicines. This is because side effects may be increased. Read labels. You can also ask the pharmacist for help. (People with high blood pressure should not use decongestants. They can raise blood pressure.) Talk with your provider or pharmacist if you have any questions about the medicine..    OTC antihistamines may help if allergies contributed to your sinusitis. Talk with your provider or pharmacist if you have any questions about the medicine..    Don't use nasal rinses or irrigation during an acute sinus infection, unless your healthcare provider tells you to. Rinsing may spread the infection to other areas in your sinuses.    Use acetaminophen or ibuprofen to control pain, unless another pain medicine was prescribed to you. If you have chronic liver or kidney disease or ever had a stomach ulcer, talk with your healthcare provider before using these medicines. Never give aspirin to anyone under age 18 who is ill with a fever. It may cause severe liver damage.    Don't smoke. This  can make symptoms worse.    Follow-up care  Follow up with your healthcare provider, or as advised.   When to seek medical advice  Call your healthcare provider if any of these occur:     Facial pain or headache that gets worse    Stiff neck    Unusual drowsiness or confusion    Swelling of your forehead or eyelids    Symptoms don't go away in 10 days    Vision problems, such as blurred or double vision    Fever of 100.4 F (38 C) or higher, or as directed by your healthcare provider  Call 911  Call 911 if any of these occur:     Seizure    Trouble breathing    Feeling dizzy or faint    Fingernails, skin or lips look blue, purple , or gray  Prevention  Here are steps you can take to help prevent an infection:     Keep good hand washing habits.    Don t have close contact with people who have sore throats, colds, or other upper respiratory infections.    Don t smoke, and stay away from secondhand smoke.    Stay up to date with of your vaccines.  [employer] last reviewed this educational content on 12/1/2019 2000-2020 [employer]. All rights reserved. This information is not intended as a substitute for professional medical care. Always follow your healthcare professional's instructions.

## 2021-04-08 DIAGNOSIS — G43.009 MIGRAINE WITHOUT AURA AND WITHOUT STATUS MIGRAINOSUS, NOT INTRACTABLE: ICD-10-CM

## 2021-04-10 ENCOUNTER — OFFICE VISIT (OUTPATIENT)
Dept: URGENT CARE | Facility: URGENT CARE | Age: 46
End: 2021-04-10
Payer: COMMERCIAL

## 2021-04-10 VITALS
TEMPERATURE: 98 F | HEART RATE: 70 BPM | BODY MASS INDEX: 22.86 KG/M2 | DIASTOLIC BLOOD PRESSURE: 82 MMHG | SYSTOLIC BLOOD PRESSURE: 118 MMHG | WEIGHT: 123 LBS | RESPIRATION RATE: 15 BRPM

## 2021-04-10 DIAGNOSIS — H18.891 CORNEAL IRRITATION OF RIGHT EYE: Primary | ICD-10-CM

## 2021-04-10 DIAGNOSIS — G43.009 MIGRAINE WITHOUT AURA AND WITHOUT STATUS MIGRAINOSUS, NOT INTRACTABLE: ICD-10-CM

## 2021-04-10 PROCEDURE — 99213 OFFICE O/P EST LOW 20 MIN: CPT | Performed by: NURSE PRACTITIONER

## 2021-04-10 RX ORDER — POLYMYXIN B SULFATE AND TRIMETHOPRIM 1; 10000 MG/ML; [USP'U]/ML
2 SOLUTION OPHTHALMIC EVERY 4 HOURS
Qty: 5 ML | Refills: 0 | Status: SHIPPED | OUTPATIENT
Start: 2021-04-10 | End: 2021-04-17

## 2021-04-10 RX ORDER — SUMATRIPTAN 50 MG/1
50 TABLET, FILM COATED ORAL
Qty: 12 TABLET | Refills: 0 | Status: SHIPPED | OUTPATIENT
Start: 2021-04-10 | End: 2021-05-07

## 2021-04-10 ASSESSMENT — PAIN SCALES - GENERAL: PAINLEVEL: MODERATE PAIN (4)

## 2021-04-10 NOTE — PROGRESS NOTES
Assessment & Plan refill is already qued for PCP.  Problem List Items Addressed This Visit     Migraine without aura and without status migrainosus, not intractable             15 minutes spent on the date of the encounter doing chart review, history and exam, documentation and further activities per the note           No follow-ups on file.    DALIA Gonzalez CNP  [facility]    Miriam Vital is a 46 year old who presents for the following health issues     HPI     Chief Complaint   Patient presents with     Eye Problem     Right eye-Red, swollen and painful x1 week.      Refill Request     Imitrex            Review of Systems   Constitutional, HEENT, cardiovascular, pulmonary, GI, , musculoskeletal, neuro, skin, endocrine and psych systems are negative, except as otherwise noted.      Objective    /82 (BP Location: Right arm, Patient Position: Sitting, Cuff Size: Adult Regular)   Pulse 70   Temp 98  F (36.7  C) (Tympanic)   Resp 15   Wt 55.8 kg (123 lb)   Breastfeeding No   BMI 22.86 kg/m    Body mass index is 22.86 kg/m .  Physical Exam   GENERAL: healthy, alert and no distress, nontoxic in appearance  EYES: Eyes grossly normal to inspection on left, mild lateral scleral injection right eye, PERRL and conjunctivae and sclerae normal  HENT: ear canals and TM's normal, nose and mouth without ulcers or lesions  NECK: supple with full ROM  MS: no gross musculoskeletal defects noted, no edema    No results found for this or any previous visit (from the past 24 hour(s)).

## 2021-04-10 NOTE — PATIENT INSTRUCTIONS
Use drops as directed.    Set up eye appointment this week for a more indepth exam and pressure check.    Follow-up with your primary care provider next week and as needed.    Indications for emergent return to emergency department discussed with patient, who verbalized good understanding and agreement.  Patient understands the limitations of today's evaluation.         Patient Education     Understanding Red Eye: Causes  Do your eyes sometimes get red and irritated? This could be a sign of irritation or infection. The inside of your eyelid and the white of your eye are covered with a membrane called the conjunctiva. When your eye is irritated or infected, the blood vessels in this membrane swell. This condition is called conjunctivitis. It is also called red eye or pink eye.     Irritation  Allergies and environmental irritants are common causes of inflamed eyes. Other causes can include:     Injuries    Objects in the eye    Some eye drops    Makeup    Contact lenses    Eye diseases    Eye fatigue  Infection  Viruses and bacteria can cause eye infections. An infection may begin in your eye. It can also start somewhere else in your body, such as your nose or throat. Sexually transmitted infections can also cause eye infections.   [employer] last reviewed this educational content on 8/1/2020 2000-2021 [employer]. All rights reserved. This information is not intended as a substitute for professional medical care. Always follow your healthcare professional's instructions.

## 2021-04-13 RX ORDER — SUMATRIPTAN 50 MG/1
TABLET, FILM COATED ORAL
Qty: 12 TABLET | Refills: 1 | OUTPATIENT
Start: 2021-04-13

## 2021-05-06 DIAGNOSIS — G43.009 MIGRAINE WITHOUT AURA AND WITHOUT STATUS MIGRAINOSUS, NOT INTRACTABLE: ICD-10-CM

## 2021-05-07 RX ORDER — SUMATRIPTAN 50 MG/1
TABLET, FILM COATED ORAL
Qty: 12 TABLET | Refills: 0 | Status: SHIPPED | OUTPATIENT
Start: 2021-05-07 | End: 2021-07-16

## 2021-07-12 DIAGNOSIS — G43.009 MIGRAINE WITHOUT AURA AND WITHOUT STATUS MIGRAINOSUS, NOT INTRACTABLE: ICD-10-CM

## 2021-07-14 NOTE — TELEPHONE ENCOUNTER
"Requested Prescriptions   Pending Prescriptions Disp Refills     SUMAtriptan (IMITREX) 50 MG tablet [Pharmacy Med Name: SUMATRIPTAN SUCCINATE 50MG TABS] 12 tablet 0     Sig: TAKE ONE TABLET BY MOUTH AT ONSET OF HEADACHE FOR MIGRAINE, MAY REPEAT DOSE AFTER 2 HOURS IF NEEDED. DO NOT TAKE MORE THAN 200MG IN 24 HOURS.       Serotonin Agonists Failed - 7/12/2021 12:02 PM        Failed - Serotonin Agonist request needs review.     Please review patient's record. If patient has had 8 or more treatments in the past month, please forward to provider.          Passed - Blood pressure under 140/90 in past 12 months     BP Readings from Last 3 Encounters:   04/10/21 118/82   03/22/21 130/82   02/04/21 102/64                 Passed - Recent (12 mo) or future (30 days) visit within the authorizing provider's specialty     Patient has had an office visit with the authorizing provider or a provider within the authorizing providers department within the previous 12 mos or has a future within next 30 days. See \"Patient Info\" tab in inbasket, or \"Choose Columns\" in Meds & Orders section of the refill encounter.              Passed - Medication is active on med list        Passed - Patient is age 18 or older        Passed - No active pregnancy on record        Passed - No positive pregnancy test in past 12 months             "

## 2021-07-16 RX ORDER — SUMATRIPTAN 50 MG/1
TABLET, FILM COATED ORAL
Qty: 12 TABLET | Refills: 0 | Status: SHIPPED | OUTPATIENT
Start: 2021-07-16 | End: 2021-08-12

## 2021-08-12 ENCOUNTER — MYC MEDICAL ADVICE (OUTPATIENT)
Dept: FAMILY MEDICINE | Facility: CLINIC | Age: 46
End: 2021-08-12

## 2021-08-12 DIAGNOSIS — G43.009 MIGRAINE WITHOUT AURA AND WITHOUT STATUS MIGRAINOSUS, NOT INTRACTABLE: ICD-10-CM

## 2021-08-12 RX ORDER — SUMATRIPTAN 50 MG/1
TABLET, FILM COATED ORAL
Qty: 12 TABLET | Refills: 4 | Status: SHIPPED | OUTPATIENT
Start: 2021-08-12 | End: 2022-03-22

## 2021-08-12 RX ORDER — SUMATRIPTAN 50 MG/1
TABLET, FILM COATED ORAL
Qty: 12 TABLET | Refills: 0 | OUTPATIENT
Start: 2021-08-12

## 2021-10-11 ENCOUNTER — HEALTH MAINTENANCE LETTER (OUTPATIENT)
Age: 46
End: 2021-10-11

## 2022-03-27 ENCOUNTER — HEALTH MAINTENANCE LETTER (OUTPATIENT)
Age: 47
End: 2022-03-27

## 2022-04-25 DIAGNOSIS — B00.1 RECURRENT COLD SORES: ICD-10-CM

## 2022-04-25 RX ORDER — VALACYCLOVIR HYDROCHLORIDE 500 MG/1
500 TABLET, FILM COATED ORAL DAILY
Qty: 90 TABLET | Refills: 0 | Status: SHIPPED | OUTPATIENT
Start: 2022-04-25

## 2022-05-07 DIAGNOSIS — G43.009 MIGRAINE WITHOUT AURA AND WITHOUT STATUS MIGRAINOSUS, NOT INTRACTABLE: ICD-10-CM

## 2022-05-09 RX ORDER — SUMATRIPTAN 50 MG/1
TABLET, FILM COATED ORAL
Qty: 9 TABLET | Refills: 0 | Status: SHIPPED | OUTPATIENT
Start: 2022-05-09

## 2022-07-22 ENCOUNTER — OFFICE VISIT (OUTPATIENT)
Dept: URGENT CARE | Facility: URGENT CARE | Age: 47
End: 2022-07-22
Payer: COMMERCIAL

## 2022-07-22 VITALS
OXYGEN SATURATION: 98 % | TEMPERATURE: 98.9 F | SYSTOLIC BLOOD PRESSURE: 102 MMHG | DIASTOLIC BLOOD PRESSURE: 64 MMHG | HEART RATE: 80 BPM

## 2022-07-22 DIAGNOSIS — R10.11 RUQ ABDOMINAL PAIN: Primary | ICD-10-CM

## 2022-07-22 PROCEDURE — 99207 PR NO CHARGE LOS: CPT | Performed by: FAMILY MEDICINE

## 2022-07-22 ASSESSMENT — PAIN SCALES - GENERAL: PAINLEVEL: MILD PAIN (3)

## 2022-07-22 NOTE — PROGRESS NOTES
THIS IS A TRIAGE EVALUATION    Zahraa Flores is a 47 year old female presenting with a chief complaint of pain lasting hours at the RUQ and radiating to the right back. The RUQ pain usually appears about 1-2 hours after eating.    .  Onset of symptoms was several weeks ago.  Course of illness is about the same.      No vomiting.   No yellowish skin/eyes  Patient does not drink.    No history of pancreatitis  .    Given the likelihood that the patient will need ultrasound and/or CT scan, patient will go to an emergency room for further evaluation.  I told the patient that I would not charge for this brief triage evaluation.     Kirill Gaspar MD

## 2022-09-08 ENCOUNTER — VIRTUAL VISIT (OUTPATIENT)
Dept: PEDIATRICS | Facility: CLINIC | Age: 47
End: 2022-09-08
Payer: COMMERCIAL

## 2022-09-08 ENCOUNTER — ANCILLARY PROCEDURE (OUTPATIENT)
Dept: MAMMOGRAPHY | Facility: CLINIC | Age: 47
End: 2022-09-08
Attending: NURSE PRACTITIONER
Payer: COMMERCIAL

## 2022-09-08 DIAGNOSIS — Z12.31 ENCOUNTER FOR SCREENING MAMMOGRAM FOR BREAST CANCER: ICD-10-CM

## 2022-09-08 DIAGNOSIS — Z76.89 ENCOUNTER TO ESTABLISH CARE: Primary | ICD-10-CM

## 2022-09-08 DIAGNOSIS — G43.009 MIGRAINE WITHOUT AURA AND WITHOUT STATUS MIGRAINOSUS, NOT INTRACTABLE: ICD-10-CM

## 2022-09-08 DIAGNOSIS — B00.9 HERPES SIMPLEX VIRUS INFECTION: ICD-10-CM

## 2022-09-08 PROBLEM — K57.32 DIVERTICULITIS OF COLON: Status: ACTIVE | Noted: 2022-09-08

## 2022-09-08 PROCEDURE — 99214 OFFICE O/P EST MOD 30 MIN: CPT | Mod: TEL | Performed by: NURSE PRACTITIONER

## 2022-09-08 PROCEDURE — 77067 SCR MAMMO BI INCL CAD: CPT | Mod: TC | Performed by: RADIOLOGY

## 2022-09-08 RX ORDER — VALACYCLOVIR HYDROCHLORIDE 500 MG/1
500 TABLET, FILM COATED ORAL DAILY
Qty: 90 TABLET | Refills: 3 | Status: SHIPPED | OUTPATIENT
Start: 2022-09-08

## 2022-09-08 RX ORDER — SUMATRIPTAN 50 MG/1
50 TABLET, FILM COATED ORAL
Qty: 20 TABLET | Refills: 0 | Status: SHIPPED | OUTPATIENT
Start: 2022-09-08

## 2022-09-08 NOTE — PROGRESS NOTES
Zahraa is a 47 year old who is being evaluated via a billable telephone visit.      What phone number would you like to be contacted at? [phone]  How would you like to obtain your AVS? MyChart    Assessment & Plan     Encounter to establish care  Histories and medications reviewed and updated.     Migraine without aura and without status migrainosus, not intractable  Previously stable on imitrex as needed, refilled. Reviewed risk for overuse headache if using imitrex more than 8 days/month. Return to clinic as needed.  - SUMAtriptan (IMITREX) 50 MG tablet; Take 1 tablet (50 mg) by mouth at onset of headache for migraine May repeat in 2 hours. Max 4 tablets/24 hours.    Herpes simplex virus infection  Stable. Continue suppressive therapy.   - valACYclovir (VALTREX) 500 MG tablet; Take 1 tablet (500 mg) by mouth daily    Encounter for screening mammogram for breast cancer  - MA Screen Bilateral w/Momo; Future    20 minutes spent on the date of the encounter doing chart review, patient visit and documentation        MEDICATIONS:  Continue current medications without change  FUTURE APPOINTMENTS:       - Follow-up for annual visit or as needed    Return in about 2 months (around 11/8/2022) for Annual Preventative Exam.    DALIA Lopez [facility]    Miriam Vital is a 47 year old, presenting for the following health issues:  Medication Request      HPI     Presents today requesting medication refill.     History of migraines. Typically occur about twice/month. Sometimes she goes months without them. Previously well managed on imitrex as needed.    Wonders if her migraines are related to her chronic back pain - hurt her back several years ago repositioning her daughter, ?herniated disc.    History of HSV (cold sores and genital sores). Has been on suppressive therapy for at least 15 years. If she misses a day, she can tell.    Patient Active Problem List   Diagnosis     Migraine  without aura and without status migrainosus, not intractable     Diverticulitis of colon     Past Medical History:   Diagnosis Date     Arthritis 2015    Hands & back     Diverticulitis of colon      Migraine without aura and without status migrainosus, not intractable      Precancerous changes of the cervix     s/p hysterectomy     Uncomplicated asthma 1992    allergy related, inhaler not needed.     Past Surgical History:   Procedure Laterality Date     COLONOSCOPY  Around     Colon resection due to diverticulitis     GYN SURGERY   &     Tubiligation & D&C then hysterectomy     HYSTERECTOMY, PAP NO LONGER INDICATED       LAPAROSCOPIC LEFT COLON RESECTION       ORTHOPEDIC SURGERY      Bones removed from feet     SURGICAL PATHOLOGY EXAM       Family History   Problem Relation Age of Onset     Uterine Cancer Mother         stage 1      Prostate Cancer Father      Hypertension Father      Breast Cancer Paternal Grandmother      Neurologic Disorder Daughter         retts     Hyperlipidemia No family hx of      Cerebrovascular Disease No family hx of      Other Cancer No family hx of      Substance Abuse No family hx of      Asthma No family hx of      Social History     Socioeconomic History     Marital status: Single     Spouse name: Not on file     Number of children: Not on file     Years of education: Not on file     Highest education level: Not on file   Occupational History     Not on file   Tobacco Use     Smoking status: Former Smoker     Packs/day: 2.00     Years: 5.00     Pack years: 10.00     Types: Cigarettes     Start date: 1991     Quit date: 1996     Years since quittin.2     Smokeless tobacco: Never Used     Tobacco comment: Quite in her 20's.   Substance and Sexual Activity     Alcohol use: Not Currently     Comment: 3x/year     Drug use: Never     Sexual activity: Yes     Partners: Male     Birth control/protection: None   Other Topics Concern     Parent/sibling w/  CABG, MI or angioplasty before 65F 55M? No   Social History Narrative    Moved to [place] for [place] last year. Moved to [place] from [place] 3 years ago.     Daughter with a disability (Rett Syndrome), busy with medical issues. 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    Son, 20 years old.    Single.     Has a good friend from [place]            Mary Roberts, DNP, APRN, CNP    09/08/22     Social Determinants of Health     Financial Resource Strain: Not on file   Food Insecurity: Not on file   Transportation Needs: Not on file   Physical Activity: Not on file   Stress: Not on file   Social Connections: Not on file   Intimate Partner Violence: Not on file   Housing Stability: Not on file     Current Outpatient Medications   Medication     SUMAtriptan (IMITREX) 50 MG tablet     valACYclovir (VALTREX) 500 MG tablet     SUMAtriptan (IMITREX) 50 MG tablet     valACYclovir (VALTREX) 500 MG tablet     No current facility-administered medications for this visit.        Allergies   Allergen Reactions     Iodine Other (See Comments)     Morphine Nausea and Vomiting     Sulfa Drugs Hives     Tramadol Unknown     Shaking and cold           Review of Systems    ROS: 10 point ROS neg other than the symptoms noted above in the HPI.        Objective       Vitals:  No vitals were obtained today due to virtual visit.    Physical Exam   healthy, alert and no distress  PSYCH: Alert and oriented times 3; coherent speech, normal   rate and volume, able to articulate logical thoughts, able   to abstract reason, no tangential thoughts, no hallucinations   or delusions  Her affect is normal and pleasant  RESP: No cough, no audible wheezing, able to talk in full sentences  Remainder of exam unable to be completed due to telephone visits          Phone call duration: 13 minutes

## 2022-09-24 ENCOUNTER — HEALTH MAINTENANCE LETTER (OUTPATIENT)
Age: 47
End: 2022-09-24

## 2022-10-24 DIAGNOSIS — G43.009 MIGRAINE WITHOUT AURA AND WITHOUT STATUS MIGRAINOSUS, NOT INTRACTABLE: Primary | ICD-10-CM

## 2022-10-24 RX ORDER — SUMATRIPTAN 50 MG/1
TABLET, FILM COATED ORAL
Qty: 20 TABLET | Refills: 0 | Status: SHIPPED | OUTPATIENT
Start: 2022-10-24

## 2022-10-24 NOTE — TELEPHONE ENCOUNTER
Last refill until appointment   Prescription approved per [facility] Refill Protocol.  Lilly Deluca RN, BSN  [facility]

## 2022-10-31 ENCOUNTER — OFFICE VISIT (OUTPATIENT)
Dept: URGENT CARE | Facility: URGENT CARE | Age: 47
End: 2022-10-31
Payer: COMMERCIAL

## 2022-10-31 VITALS
HEART RATE: 85 BPM | SYSTOLIC BLOOD PRESSURE: 118 MMHG | OXYGEN SATURATION: 100 % | RESPIRATION RATE: 20 BRPM | DIASTOLIC BLOOD PRESSURE: 78 MMHG

## 2022-10-31 DIAGNOSIS — F43.21 GRIEF: ICD-10-CM

## 2022-10-31 DIAGNOSIS — R07.9 CHEST PAIN, UNSPECIFIED TYPE: Primary | ICD-10-CM

## 2022-10-31 PROCEDURE — 99214 OFFICE O/P EST MOD 30 MIN: CPT | Performed by: PHYSICIAN ASSISTANT

## 2022-10-31 PROCEDURE — 93000 ELECTROCARDIOGRAM COMPLETE: CPT | Performed by: PHYSICIAN ASSISTANT

## 2022-10-31 ASSESSMENT — ENCOUNTER SYMPTOMS
DIARRHEA: 0
NECK PAIN: 0
ARTHRALGIAS: 0
ENDOCRINE NEGATIVE: 1
SORE THROAT: 0
MYALGIAS: 0
NAUSEA: 0
BACK PAIN: 0
EYES NEGATIVE: 1
WOUND: 0
PALPITATIONS: 0
SHORTNESS OF BREATH: 0
JOINT SWELLING: 0
RHINORRHEA: 0
ALLERGIC/IMMUNOLOGIC NEGATIVE: 1
HEADACHES: 0
NECK STIFFNESS: 0
LIGHT-HEADEDNESS: 0
VOMITING: 0
COUGH: 0
FEVER: 0
MUSCULOSKELETAL NEGATIVE: 1
DIZZINESS: 0
CHILLS: 0
RESPIRATORY NEGATIVE: 1
WEAKNESS: 0

## 2022-10-31 NOTE — PROGRESS NOTES
Chief Complaint:     Chief Complaint   Patient presents with     Chest Pain     Patient is having chest pain        No results found for any visits on 10/31/22.    Medical Decision Making:    Vital signs reviewed by Houston Clement PA-C  /78   Pulse 85   Resp 20   LMP  (LMP Unknown)   SpO2 100%     Differential Diagnosis:  Cardiac: Acute MI  Chest wall Pain  Pleurisy  Palpitations  Pericarditis  Panic/Anxiety        ASSESSMENT    1. Chest pain, unspecified type    2. Grief        PLAN    Patient is in no acute distress.  She is afebrile with stable vital signs.  Lung sounds were clear, and O2 sats at 100% on RA.    EKG was negative for any acute ST or ischemic changes per my read.  At this time I can not rule out acute coronary syndrome.  Patient instructed to go to the ED now for further evaluation, and complete cardiac workup.  Patient would also benefit from psych consult with recent loss of her daughter.    Patient declined EMS transport.    Follow up with your PCP in 1 week if symptoms are not improving.  Patient verbalized understanding and agreed with this plan.    Labs:    No results found for any visits on 10/31/22.     Vital signs reviewed by Houston Clement PA-C  /78   Pulse 85   Resp 20   LMP  (LMP Unknown)   SpO2 100%     Current Meds      Current Outpatient Medications:      SUMAtriptan (IMITREX) 50 MG tablet, TAKE ONE TABLET BY MOUTH AT ONSET OF HEADACHE FOR MIGRAINE, MAY REPEAT DOSE AFTER 2 HOURS IF NEEDED. DO NOT TAKE MORE THAN 200MG IN 24 HOURS. (Patient not taking: Reported on 10/31/2022), Disp: 20 tablet, Rfl: 0     SUMAtriptan (IMITREX) 50 MG tablet, Take 1 tablet (50 mg) by mouth at onset of headache for migraine May repeat in 2 hours. Max 4 tablets/24 hours. (Patient not taking: Reported on 10/31/2022), Disp: 20 tablet, Rfl: 0     SUMAtriptan (IMITREX) 50 MG tablet, TAKE ONE TABLET BY MOUTH AT ONSET OF HEADACHE FOR MIGRAINE, MAY REPEAT DOSE AFTER 2 HOURS IF NEEDED. DO NOT  TAKE MORE THAN 200MG IN 24 HOURS.-DUE FOR RECHECK IN CLINIC (Patient not taking: Reported on 10/31/2022), Disp: 9 tablet, Rfl: 0     valACYclovir (VALTREX) 500 MG tablet, Take 1 tablet (500 mg) by mouth daily (Patient not taking: Reported on 10/31/2022), Disp: 90 tablet, Rfl: 3     valACYclovir (VALTREX) 500 MG tablet, TAKE 1 TABLET (500 MG) BY MOUTH DAILY (Patient not taking: Reported on 10/31/2022), Disp: 90 tablet, Rfl: 0      Respiratory History    occasional episodes of bronchitis      SUBJECTIVE    HPI: Zahraa Flores is an 47 year old female who presents with chest pain.  Patient's daughter passed away 2 days ago.  The chest pain started 1 day ago and has worsened.  The pain is sharp in nature on the L side and does radiate up into the shoulder.  Activity makes the pain worse.  She has not tried anything for the pain.  She denies any fever, chills, or recent illness.  No Hx of heart problems.  No dizziness, or palpitations.         ROS:     Review of Systems   Constitutional: Negative for chills and fever.   HENT: Negative for congestion, ear pain, rhinorrhea and sore throat.    Eyes: Negative.    Respiratory: Negative.  Negative for cough and shortness of breath.    Cardiovascular: Positive for chest pain. Negative for palpitations.   Gastrointestinal: Negative for diarrhea, nausea and vomiting.   Endocrine: Negative.    Genitourinary: Negative.    Musculoskeletal: Negative.  Negative for arthralgias, back pain, joint swelling, myalgias, neck pain and neck stiffness.   Skin: Negative.  Negative for rash and wound.   Allergic/Immunologic: Negative.  Negative for immunocompromised state.   Neurological: Negative for dizziness, weakness, light-headedness and headaches.         Family History   Family History   Problem Relation Age of Onset     Uterine Cancer Mother         stage 1      Prostate Cancer Father      Hypertension Father      Breast Cancer Paternal Grandmother      Neurologic Disorder Daughter          retts     Hyperlipidemia No family hx of      Cerebrovascular Disease No family hx of      Other Cancer No family hx of      Substance Abuse No family hx of      Asthma No family hx of         Problem history  Patient Active Problem List   Diagnosis     Migraine without aura and without status migrainosus, not intractable     Diverticulitis of colon        Allergies  Allergies   Allergen Reactions     Iodine Other (See Comments)     Morphine Nausea and Vomiting     Sulfa Drugs Hives     Tramadol Unknown     Shaking and cold        Social History  Social History     Socioeconomic History     Marital status: Single     Spouse name: Not on file     Number of children: Not on file     Years of education: Not on file     Highest education level: Not on file   Occupational History     Not on file   Tobacco Use     Smoking status: Former     Packs/day: 2.00     Years: 5.00     Pack years: 10.00     Types: Cigarettes     Start date: 1991     Quit date: 1996     Years since quittin.3     Smokeless tobacco: Never     Tobacco comments:     Quite in her 20's.   Substance and Sexual Activity     Alcohol use: Not Currently     Comment: 3x/year     Drug use: Never     Sexual activity: Yes     Partners: Male     Birth control/protection: None   Other Topics Concern     Parent/sibling w/ CABG, MI or angioplasty before 65F 55M? No   Social History Narrative    Moved to [facility] last year. Moved to [place] from [place] 3 years ago.     Daughter with a disability (Rett Syndrome), busy with medical issues. 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    Son, 20 years old.    Single.     Has a good friend from [place]            Mary Roberts, DNP, APRN, CNP    22     Social Determinants of Health     Financial Resource Strain: Not on file   Food Insecurity: Not on file   Transportation Needs: Not on file   Physical Activity: Not on file   Stress: Not on file   Social Connections: Not on file   Intimate Partner  Violence: Not on file   Housing Stability: Not on file        OBJECTIVE     Vital signs reviewed by Houston Clement PA-C  /78   Pulse 85   Resp 20   LMP  (LMP Unknown)   SpO2 100%      Physical Exam  Vitals and nursing note reviewed.   Constitutional:       General: She is not in acute distress.     Appearance: She is well-developed. She is not ill-appearing, toxic-appearing or diaphoretic.   HENT:      Head: Normocephalic and atraumatic.      Right Ear: Tympanic membrane and external ear normal. No drainage, swelling or tenderness. Tympanic membrane is not perforated, erythematous, retracted or bulging.      Left Ear: Tympanic membrane and external ear normal. No drainage, swelling or tenderness. Tympanic membrane is not perforated, erythematous, retracted or bulging.      Nose: No mucosal edema, congestion or rhinorrhea.      Right Sinus: No maxillary sinus tenderness or frontal sinus tenderness.      Left Sinus: No maxillary sinus tenderness or frontal sinus tenderness.      Mouth/Throat:      Pharynx: No pharyngeal swelling, oropharyngeal exudate, posterior oropharyngeal erythema or uvula swelling.      Tonsils: No tonsillar abscesses.   Eyes:      Pupils: Pupils are equal, round, and reactive to light.   Neck:      Trachea: Trachea normal.   Cardiovascular:      Rate and Rhythm: Normal rate and regular rhythm.      Heart sounds: Normal heart sounds, S1 normal and S2 normal. No murmur heard.    No friction rub. No gallop.   Pulmonary:      Effort: Pulmonary effort is normal. No respiratory distress.      Breath sounds: Normal breath sounds. No decreased breath sounds, wheezing, rhonchi or rales.   Abdominal:      General: Bowel sounds are normal. There is no distension.      Palpations: Abdomen is soft. Abdomen is not rigid. There is no mass.      Tenderness: There is no abdominal tenderness. There is no guarding or rebound.   Musculoskeletal:      Cervical back: Full passive range of motion without  pain, normal range of motion and neck supple.   Lymphadenopathy:      Cervical: No cervical adenopathy.   Skin:     General: Skin is warm and dry.   Neurological:      Mental Status: She is alert and oriented to person, place, and time.      Cranial Nerves: No cranial nerve deficit.      Deep Tendon Reflexes: Reflexes are normal and symmetric.   Psychiatric:         Behavior: Behavior normal. Behavior is cooperative.         Thought Content: Thought content normal.         Judgment: Judgment normal.           Houston Clement PA-C  10/31/2022, 2:41 PM

## 2022-11-09 NOTE — TELEPHONE ENCOUNTER
Pt canceled appointment, routed to TC, please call pt to schedule  Return in about 2 months (around 11/8/2022) for Annual Preventative Exam.     DALIA Lopez CNP  [facility] Deluca RN, BSN  [facility]

## 2022-11-10 RX ORDER — SUMATRIPTAN 50 MG/1
TABLET, FILM COATED ORAL
Qty: 20 TABLET | Refills: 0 | OUTPATIENT
Start: 2022-11-10

## 2022-11-10 NOTE — TELEPHONE ENCOUNTER
[place]rm[place], Shiresa H Vogelgesang, Mary, RN  Patient cancelled bc she is moving out of state.   Lilly Deluca RN, BSN  [facility]

## 2022-11-10 NOTE — TELEPHONE ENCOUNTER
Called patient.    Patient is moving to [place].    Patient lost her daughter within the past month but patient had a cluster of migraines for 3-4 days.     Patient still has sumatriptan and will wait to establish care in [place] for further refills.     Coni Kulkarni RN on 11/10/2022 at 10:48 AM

## 2023-05-08 ENCOUNTER — HEALTH MAINTENANCE LETTER (OUTPATIENT)
Age: 48
End: 2023-05-08

## 2023-12-23 ENCOUNTER — HEALTH MAINTENANCE LETTER (OUTPATIENT)
Age: 48
End: 2023-12-23

## 2024-05-11 ENCOUNTER — HEALTH MAINTENANCE LETTER (OUTPATIENT)
Age: 49
End: 2024-05-11

## 2024-12-01 ENCOUNTER — HEALTH MAINTENANCE LETTER (OUTPATIENT)
Age: 49
End: 2024-12-01